# Patient Record
Sex: MALE | Race: WHITE | Employment: OTHER | URBAN - METROPOLITAN AREA
[De-identification: names, ages, dates, MRNs, and addresses within clinical notes are randomized per-mention and may not be internally consistent; named-entity substitution may affect disease eponyms.]

---

## 2017-02-02 ENCOUNTER — HOSPITAL ENCOUNTER (OUTPATIENT)
Dept: PHYSICAL THERAPY | Age: 52
Discharge: HOME | End: 2017-02-02
Admitting: NEUROLOGICAL SURGERY

## 2017-02-03 ENCOUNTER — HOSPITAL ENCOUNTER (OUTPATIENT)
Dept: OTHER | Age: 52
Discharge: HOME | End: 2017-02-28
Attending: INTERNAL MEDICINE | Admitting: INTERNAL MEDICINE

## 2017-02-07 ENCOUNTER — HOSPITAL ENCOUNTER (OUTPATIENT)
Dept: PHYSICAL THERAPY | Age: 52
Discharge: HOME | End: 2017-02-07
Admitting: INTERNAL MEDICINE

## 2017-02-09 ENCOUNTER — HOSPITAL ENCOUNTER (OUTPATIENT)
Dept: PHYSICAL THERAPY | Age: 52
Discharge: HOME | End: 2017-02-09
Admitting: INTERNAL MEDICINE

## 2017-02-14 ENCOUNTER — HOSPITAL ENCOUNTER (OUTPATIENT)
Dept: PHYSICAL THERAPY | Age: 52
Discharge: HOME | End: 2017-02-14
Admitting: INTERNAL MEDICINE

## 2017-02-16 ENCOUNTER — HOSPITAL ENCOUNTER (OUTPATIENT)
Dept: PHYSICAL THERAPY | Age: 52
Discharge: HOME | End: 2017-02-16
Admitting: INTERNAL MEDICINE

## 2017-02-28 ENCOUNTER — HOSPITAL ENCOUNTER (OUTPATIENT)
Dept: PHYSICAL THERAPY | Age: 52
Discharge: HOME | End: 2017-02-28
Admitting: INTERNAL MEDICINE

## 2017-03-02 ENCOUNTER — HOSPITAL ENCOUNTER (OUTPATIENT)
Dept: PHYSICAL THERAPY | Age: 52
Discharge: HOME | End: 2017-03-03
Admitting: NEUROLOGICAL SURGERY

## 2017-11-07 ENCOUNTER — PAT TELEPHONE (OUTPATIENT)
Dept: PREADMISSION TESTING | Age: 52
End: 2017-11-07

## 2017-11-07 VITALS — BODY MASS INDEX: 40.09 KG/M2 | WEIGHT: 280 LBS | HEIGHT: 70 IN

## 2017-11-07 RX ORDER — DIMENHYDRINATE 50 MG
TABLET ORAL
COMMUNITY

## 2017-11-07 RX ORDER — LANOLIN ALCOHOL/MO/W.PET/CERES
1000 CREAM (GRAM) TOPICAL DAILY
COMMUNITY

## 2017-11-07 NOTE — PROGRESS NOTES
[facility] time___1000_________        Surgery time__1100__________    Take the following medications with a sip of water:    Do not eat or drink anything after 12:00 midnight prior to your surgery. EXCEPT PREP  This includes water chewing gum, mints and ice chips. You may brush your teeth and gargle the morning of your surgery, but do not swallow the water       You may be asked to stop blood thinners such as Coumadin, Plavix, Fragmin, Lovenox, etc., or any anti-inflammatories such as:  Aspirin, Ibuprofen, Advil, Naproxen prior to your surgery. We also ask that you stop any OTC medications such as fish oil, vitamin E, glucosamine, garlic, Multivitamins, COQ 10, etc.    We ask that you do not smoke 24 hours prior to surgery  We ask that you do not  drink any alcoholic beverages 24 hours prior to surgery     You must make arrangements for a responsible adult to take you home after your surgery. For your safety you will not be allowed to leave alone or drive yourself home. Your surgery will be cancelled if you do not have a ride home. Also for your safety, it is strongly suggested that someone stay with you the first 24 hours after your surgery. A parent or legal guardian must accompany a child scheduled for surgery and plan to stay at the hospital until the child is discharged. Please do not bring other children with you. For your comfort, please wear simple loose fitting clothing to the hospital.  Please do not bring valuables. Do not wear any make-up or nail polish on your fingers or toes      For your safety, please do not wear any jewelry or body piercing's on the day of surgery. All jewelry must be removed. If you have dentures, they will be removed before going to operating room. For your convenience, we will provide you with a container.     If you wear contact lenses or glasses, they will be removed, please bring a case for them.     If you have a living will and a durable power of  for healthcare, please bring in a copy. As part of our patient safety program to minimize surgical site infections, we ask you to do the following:    · Please notify your surgeon if you develop any illness between         now and the  day of your surgery. · This includes a cough, cold, fever, sore throat, nausea,         or vomiting, and diarrhea, etc.  ·  Please notify your surgeon if you experience dizziness, shortness         of breath or blurred vision between now and the time of your surgery. You may shower the night before surgery or the morning of   your surgery with an antibacterial soap. You will need to bring a photo ID and insurance card    [facility] has an onsite pharmacy, would you like to utilize our pharmacy     If you will be staying overnight and use a C-pap machine, please bring   your C-pap to hospital     Our goal is to provide you with excellent care, therefore, visitors will be limited to two(2) in the room at a time so that we may focus on providing this care for you. Please contact pre-admission testing if you have any further questions. [facility] phone number:  [phone]  Please note these are generalized instructions for all surgical cases, you may be provided with more specific instructions according to your surgery.

## 2017-11-14 ENCOUNTER — HOSPITAL ENCOUNTER (OUTPATIENT)
Dept: ENDOSCOPY | Age: 52
Discharge: HOME | End: 2017-11-14
Attending: INTERNAL MEDICINE | Admitting: INTERNAL MEDICINE

## 2017-11-14 VITALS
DIASTOLIC BLOOD PRESSURE: 58 MMHG | HEART RATE: 75 BPM | RESPIRATION RATE: 16 BRPM | SYSTOLIC BLOOD PRESSURE: 132 MMHG | WEIGHT: 271 LBS | TEMPERATURE: 97.4 F | BODY MASS INDEX: 38.8 KG/M2 | OXYGEN SATURATION: 97 % | HEIGHT: 70 IN

## 2017-11-14 RX ORDER — PROMETHAZINE HYDROCHLORIDE 25 MG/ML
6.25 INJECTION, SOLUTION INTRAMUSCULAR; INTRAVENOUS
Status: ACTIVE | OUTPATIENT
Start: 2017-11-14 | End: 2017-11-14

## 2017-11-14 RX ORDER — SODIUM CHLORIDE 0.9 % (FLUSH) 0.9 %
10 SYRINGE (ML) INJECTION EVERY 12 HOURS SCHEDULED
Status: DISCONTINUED | OUTPATIENT
Start: 2017-11-14 | End: 2017-11-15 | Stop reason: HOSPADM

## 2017-11-14 RX ORDER — LABETALOL HYDROCHLORIDE 5 MG/ML
5 INJECTION, SOLUTION INTRAVENOUS EVERY 10 MIN PRN
Status: DISCONTINUED | OUTPATIENT
Start: 2017-11-14 | End: 2017-11-15 | Stop reason: HOSPADM

## 2017-11-14 RX ORDER — SODIUM CHLORIDE 9 MG/ML
INJECTION, SOLUTION INTRAVENOUS CONTINUOUS
Status: DISCONTINUED | OUTPATIENT
Start: 2017-11-14 | End: 2017-11-15 | Stop reason: HOSPADM

## 2017-11-14 RX ORDER — ONDANSETRON 2 MG/ML
4 INJECTION INTRAMUSCULAR; INTRAVENOUS
Status: ACTIVE | OUTPATIENT
Start: 2017-11-14 | End: 2017-11-14

## 2017-11-14 RX ORDER — SODIUM CHLORIDE 0.9 % (FLUSH) 0.9 %
10 SYRINGE (ML) INJECTION PRN
Status: DISCONTINUED | OUTPATIENT
Start: 2017-11-14 | End: 2017-11-15 | Stop reason: HOSPADM

## 2017-11-14 RX ADMIN — SODIUM CHLORIDE: 9 INJECTION, SOLUTION INTRAVENOUS at 10:55

## 2017-11-14 RX ADMIN — SODIUM CHLORIDE: 9 INJECTION, SOLUTION INTRAVENOUS at 10:52

## 2017-11-14 ASSESSMENT — PAIN SCALES - GENERAL
PAINLEVEL_OUTOF10: 0
PAINLEVEL_OUTOF10: 0

## 2017-11-14 ASSESSMENT — PAIN - FUNCTIONAL ASSESSMENT: PAIN_FUNCTIONAL_ASSESSMENT: 0-10

## 2017-11-14 NOTE — ANESTHESIA PRE-OP
[facility] Department of Anesthesiology  Pre-Anesthesia Evaluation/Consultation       Name:  Maranda Lang  : 1965  Age:  50 y.o. MRN:  [id number]  Date: 2017           Procedure (Scheduled):  Colonoscopy  Surgeon:  Dr. Clara Mason     No Known Allergies  There is no problem list on file for this patient. Past Medical History:   Diagnosis Date    CAD (coronary artery disease)     MI, old      Past Surgical History:   Procedure Laterality Date    BACK SURGERY      CARDIAC SURGERY      SENTS     Social History   Substance Use Topics    Smoking status: Former Smoker    Smokeless tobacco: Never Used    Alcohol use Yes      Comment: SOCIAL     Medications  Current Outpatient Prescriptions on File Prior to Encounter   Medication Sig Dispense Refill    aspirin 81 MG tablet Take 81 mg by mouth daily      Coenzyme Q10 (CO Q-10) 100 MG CAPS Take by mouth      vitamin B-12 (CYANOCOBALAMIN) 1000 MCG tablet Take 1,000 mcg by mouth daily       No current facility-administered medications on file prior to encounter. Current Outpatient Prescriptions   Medication Sig Dispense Refill    aspirin 81 MG tablet Take 81 mg by mouth daily      Coenzyme Q10 (CO Q-10) 100 MG CAPS Take by mouth      vitamin B-12 (CYANOCOBALAMIN) 1000 MCG tablet Take 1,000 mcg by mouth daily       Current Facility-Administered Medications   Medication Dose Route Frequency Provider Last Rate Last Dose    0.9 % sodium chloride infusion   Intravenous Continuous Zuly Alvarez MD        sodium chloride flush 0.9 % injection 10 mL  10 mL Intravenous 2 times per day Zuyl Alvarez MD        sodium chloride flush 0.9 % injection 10 mL  10 mL Intravenous PRN Zuly Alvarez MD        famotidine (PEPCID) injection 20 mg  20 mg Intravenous Once Zuly Alvarez MD         Vital Signs (Current) There were no vitals filed for this visit.   Vital Signs Statistics (for past 48 hrs)     No Data

## 2017-11-14 NOTE — PROCEDURES
[address] [place] [address] GI  Endoscopy Note    Patient: Usha Herrerauty  : 1965  Acct#: [de-identified]    Procedure: Colonoscopy     Date:  2017    Surgeon:  Colonel Chidi MD    Referring Physician:  Armen Mendoza    Previous Colonoscopy: No  Date: na  Greater than 3 years? na    Preoperative Diagnosis:  screening    Postoperative Diagnosis:  Normal colon    Anesthesia:  See anesthesia note    Indications: This is a 50y.o. year old male who presents today with screening for colon cancer. Procedure: An informed consent was obtained from the patient after explanation of indications, benefits, possible risks and complications of the procedure. The patient was then taken to the endoscopy suite, placed in the left lateral decubitus position, and the above IV anesthesia was administered. A digital rectal examination was performed and revealed negative without mass, lesions or tenderness. The Olympus CFQ-180-AL video colonoscope was placed in the patient's rectum under digital direction and advanced to the cecum. The cecum was identified by characteristic anatomy and ballottment. The prep was excellent. The ileocecal valve was identified. The scope was then withdrawn back through the cecum, ascending, transverse, descending and sigmoid colons. Carefull circumferential examination of the mucosa in these areas demonstrated normal colonic mucosa throughout. The scope was then withdrawn into the rectum and retroflexed. The retroflexed view of the anal verge and rectum demonstrates no abnormalities. The scope was straightened, the colon was decompressed and the scope was withdrawn from the patient. The patient tolerated the procedure well and was taken to the PACU in good condition. Estimated Blood Loss:  none    Impression:  Normal colon    Recommendations:  Repeat colonoscopy in 10 years.     Colonel Chidi MD   [facility]  2017

## 2017-11-14 NOTE — H&P
[facility]   Pre-operative History and Physical    Patient: Miller Sanchez  : 1965  Acct#: [de-identified]    History Obtained From: electronic medical record    HISTORY OF PRESENT ILLNESS  Procedure:Colonoscopy  Indications:screening  Past Medical History:        Diagnosis Date    CAD (coronary artery disease)     Hypertension     MI, old      Past Surgical History:        Procedure Laterality Date    BACK SURGERY      CARDIAC SURGERY      SENTS     Medications Prior to Admission:   Current Outpatient Prescriptions on File Prior to Encounter   Medication Sig Dispense Refill    aspirin 81 MG tablet Take 81 mg by mouth daily      Coenzyme Q10 (CO Q-10) 100 MG CAPS Take by mouth      vitamin B-12 (CYANOCOBALAMIN) 1000 MCG tablet Take 1,000 mcg by mouth daily       No current facility-administered medications on file prior to encounter. Allergies:  Review of patient's allergies indicates no known allergies. Social History     Social History    Marital status:      Spouse name: N/A    Number of children: N/A    Years of education: N/A     Occupational History    Not on file. Social History Main Topics    Smoking status: Former Smoker    Smokeless tobacco: Never Used    Alcohol use Yes      Comment: SOCIAL    Drug use: Unknown    Sexual activity: Not on file     Other Topics Concern    Not on file     Social History Narrative    No narrative on file     Family History   Problem Relation Age of Onset    Heart Disease Mother     Heart Disease Father     Heart Attack Father          PHYSICAL EXAM:      BP (!) 151/97   Pulse 79   Temp 98.1 °F (36.7 °C) (Temporal)   Resp 12   Ht 5' 10\" (1.778 m)   Wt 271 lb (122.9 kg)   SpO2 97%   BMI 38.88 kg/m²  I        Heart:normal    Lungs: normal    Abdomen: normal      ASA Grade:  See anesthesia note      ASSESSMENT AND PLAN:    1. Procedure options, risks and benefits reviewed with patient and expresses understanding.

## 2017-11-14 NOTE — ANESTHESIA POST-OP
[facility] Department of Anesthesiology  Post-Anesthesia Note       Name:  Niko Thibodeaux                                  Age:  50 y.o. MRN:  [id number]     Last Vitals & Oxygen Saturation: BP (!) 132/58   Pulse 75   Temp 97.4 °F (36.3 °C) (Tympanic)   Resp 16   Ht 5' 10\" (1.778 m)   Wt 271 lb (122.9 kg)   SpO2 97%   BMI 38.88 kg/m²   Patient Vitals for the past 4 hrs:   BP Temp Temp src Pulse Resp SpO2 Height Weight   11/14/17 1150 (!) 132/58 - - 75 16 97 % - -   11/14/17 1140 131/67 - - 78 16 98 % - -   11/14/17 1130 119/70 97.4 °F (36.3 °C) Tympanic 77 16 96 % - -   11/14/17 1033 (!) 151/97 98.1 °F (36.7 °C) Temporal 79 12 97 % 5' 10\" (1.778 m) 271 lb (122.9 kg)       Level of consciousness:  Awake, alert    Respiratory: Respirations easy, no distress. Stable. Cardiovascular: Hemodynamically stable. Hydration: Adequate. PONV: Adequately managed. Post-op pain: Adequately controlled. Post-op assessment: Tolerated anesthetic well without complication. Complications:  None.     Ann Smith MD  November 14, 2017   12:36 PM

## 2017-11-15 LAB
EKG ATRIAL RATE: 70 BPM
EKG DIAGNOSIS: NORMAL
EKG P AXIS: 26 DEGREES
EKG P-R INTERVAL: 172 MS
EKG Q-T INTERVAL: 404 MS
EKG QRS DURATION: 112 MS
EKG QTC CALCULATION (BAZETT): 436 MS
EKG R AXIS: -59 DEGREES
EKG T AXIS: 22 DEGREES
EKG VENTRICULAR RATE: 70 BPM

## 2022-07-22 ENCOUNTER — HOSPITAL ENCOUNTER (OUTPATIENT)
Dept: VASCULAR LAB | Age: 57
Discharge: HOME | End: 2022-07-22
Payer: COMMERCIAL

## 2022-07-22 DIAGNOSIS — M79.89 LEG SWELLING: ICD-10-CM

## 2022-07-22 PROCEDURE — 93971 EXTREMITY STUDY: CPT

## 2022-09-14 ENCOUNTER — TRANSCRIBE ORDERS (OUTPATIENT)
Dept: ADMINISTRATIVE | Age: 57
End: 2022-09-14

## 2022-09-14 DIAGNOSIS — M54.16 LUMBAR RADICULOPATHY: ICD-10-CM

## 2022-09-14 DIAGNOSIS — M54.50 CHRONIC LOW BACK PAIN, UNSPECIFIED BACK PAIN LATERALITY, UNSPECIFIED WHETHER SCIATICA PRESENT: Primary | ICD-10-CM

## 2022-09-14 DIAGNOSIS — M21.372 LEFT FOOT DROP: ICD-10-CM

## 2022-09-14 DIAGNOSIS — M79.89 SWOLLEN LEG: ICD-10-CM

## 2022-09-14 DIAGNOSIS — G89.29 CHRONIC LOW BACK PAIN, UNSPECIFIED BACK PAIN LATERALITY, UNSPECIFIED WHETHER SCIATICA PRESENT: Primary | ICD-10-CM

## 2022-09-14 DIAGNOSIS — Z98.1 S/P LUMBAR SPINAL FUSION: ICD-10-CM

## 2022-09-24 ENCOUNTER — HOSPITAL ENCOUNTER (OUTPATIENT)
Dept: MRI IMAGING | Age: 57
Discharge: HOME | End: 2022-09-24
Payer: COMMERCIAL

## 2022-09-24 DIAGNOSIS — M21.372 LEFT FOOT DROP: ICD-10-CM

## 2022-09-24 DIAGNOSIS — M54.50 CHRONIC LOW BACK PAIN, UNSPECIFIED BACK PAIN LATERALITY, UNSPECIFIED WHETHER SCIATICA PRESENT: ICD-10-CM

## 2022-09-24 DIAGNOSIS — M54.16 LUMBAR RADICULOPATHY: ICD-10-CM

## 2022-09-24 DIAGNOSIS — M79.89 SWOLLEN LEG: ICD-10-CM

## 2022-09-24 DIAGNOSIS — G89.29 CHRONIC LOW BACK PAIN, UNSPECIFIED BACK PAIN LATERALITY, UNSPECIFIED WHETHER SCIATICA PRESENT: ICD-10-CM

## 2022-09-24 DIAGNOSIS — Z98.1 S/P LUMBAR SPINAL FUSION: ICD-10-CM

## 2022-09-24 PROCEDURE — 72148 MRI LUMBAR SPINE W/O DYE: CPT

## 2023-04-03 ENCOUNTER — HOSPITAL ENCOUNTER (OUTPATIENT)
Dept: PHYSICAL THERAPY | Age: 58
Setting detail: THERAPIES SERIES
Discharge: HOME | End: 2023-04-03
Payer: COMMERCIAL

## 2023-04-03 PROCEDURE — 97530 THERAPEUTIC ACTIVITIES: CPT

## 2023-04-03 PROCEDURE — 97162 PT EVAL MOD COMPLEX 30 MIN: CPT

## 2023-04-03 NOTE — FLOWSHEET NOTE
38% to 15% to assist with reaching prior level of function. [] Progressing: [] Met: [] Not Met: [] Adjusted  2. Patient will demonstrate increased AROM to WNL, good LS mobility, good hip ROM to allow for proper joint functioning as indicated by patients Functional Deficits. [] Progressing: [] Met: [] Not Met: [] Adjusted  3. Patient will demonstrate an increase in Strength to good proximal hip and core activation to allow for proper functional mobility as indicated by patients Functional Deficits. [] Progressing: [] Met: [] Not Met: [] Adjusted  4. Patient will be able to ambulate a flight of stairs reciprocally without increased symptoms or restriction. [] Progressing: [] Met: [] Not Met: [] Adjusted  5. Able to perform at least 15 reps of sit to  30sec to get closer to age adjusted norms. [] Progressing: [] Met: [] Not Met: [] Adjusted         ASSESSMENT:  See eval    Treatment/Activity Tolerance:  [x] Patient tolerated treatment well [] Patient limited by fatique  [] Patient limited by pain  [] Patient limited by other medical complications  [] Other:     Overall Progression Towards Functional goals/ Treatment Progress Update:  [] Patient is progressing as expected towards functional goals listed. [] Progression is slowed due to complexities/Impairments listed. [] Progression has been slowed due to co-morbidities. [x] Plan just implemented, too soon to assess goals progression <30days   [] Goals require adjustment due to lack of progress  [] Patient is not progressing as expected and requires additional follow up with physician  [] Other:    Prognosis for POC: [x] Good [] Fair  [] Poor    Patient requires continued skilled intervention: [x] Yes  [] No        PLAN: See eval  [] Continue per plan of care [] Alter current plan (see comments)  [x] Plan of care initiated [] Hold pending MD visit [] Discharge    Electronically signed by:  Nidhi Hyman, PT , DPT  #[id number]      Note: If patient

## 2023-04-03 NOTE — PLAN OF CARE
joint protection, postural re-education, activity modification, progression of HEP. [x]  Aquatic exercise including: strength training, ROM and balance for LE, Glutes and core     HEP instruction: sit to stands    GOALS:  Patient stated goal: able to walk 1 mile w/o severe limitation or pain. [] Progressing: [] Met: [] Not Met: [] Adjusted  Therapist goals for Patient:   Short Term Goals: To be achieved in: 2 weeks  1. Independent in HEP and progression per patient tolerance, in order to prevent re-injury. [] Progressing: [] Met: [] Not Met: [] Adjusted  2. Patient will have a decrease in pain to facilitate improvement in movement, function, and ADLs as indicated by Functional Deficits. [] Progressing: [] Met: [] Not Met: [] Adjusted    Long Term Goals: To be achieved in: up to 10 weeks  1. Decrease Modified Oswestry functional outcome score from 38% to 15% to assist with reaching prior level of function. [] Progressing: [] Met: [] Not Met: [] Adjusted  2. Patient will demonstrate increased AROM to WNL, good LS mobility, good hip ROM to allow for proper joint functioning as indicated by patients Functional Deficits. [] Progressing: [] Met: [] Not Met: [] Adjusted  3. Patient will demonstrate an increase in Strength to good proximal hip and core activation to allow for proper functional mobility as indicated by patients Functional Deficits. [] Progressing: [] Met: [] Not Met: [] Adjusted  4. Patient will be able to ambulate a flight of stairs reciprocally without increased symptoms or restriction. [] Progressing: [] Met: [] Not Met: [] Adjusted  5. Able to perform at least 15 reps of sit to  30sec to get closer to age adjusted norms. [] Progressing: [] Met: [] Not Met: [] Adjusted     Electronically signed by:   Treasure Valentin, PT , DPT  #[id number]          Note: If patient does not return for scheduled/recommended follow up visits, this note will serve as a discharge from care along with the

## 2023-04-06 ENCOUNTER — HOSPITAL ENCOUNTER (OUTPATIENT)
Dept: PHYSICAL THERAPY | Age: 58
Setting detail: THERAPIES SERIES
Discharge: HOME | End: 2023-04-06
Payer: COMMERCIAL

## 2023-04-06 PROCEDURE — 97530 THERAPEUTIC ACTIVITIES: CPT

## 2023-04-06 PROCEDURE — 97110 THERAPEUTIC EXERCISES: CPT

## 2023-04-06 NOTE — FLOWSHEET NOTE
coordination, kinesthetic sense, posture, motor skill, proprioception  to assist with core control in self care, mobility, lifting, and ambulation. Therapeutic Activities:    [] (06047 or 14673) Provided verbal/tactile cueing for activities related to improving balance, coordination, kinesthetic sense, posture, motor skill, proprioception and motor activation to allow for proper function  with self care and ADLs  [] (23513) Provided training and instruction to the patient for proper core and proximal hip recruitment and positioning with ambulation re-education     Home Exercise Program:    [] (46958) Reviewed/Progressed HEP activities related to strengthening, flexibility, endurance, ROM of core, proximal hip and LE for functional self-care, mobility, lifting and ambulation   [] (04196) Reviewed/Progressed HEP activities related to improving balance, coordination, kinesthetic sense, posture, motor skill, proprioception of core, proximal hip and LE for self care, mobility, lifting, and ambulation      Manual Treatments:  PROM / STM / Oscillations-Mobs:  G-I, II, III, IV (PA's, Inf., Post.)  [] (77837) Provided manual therapy to mobilize proximal hip and LS spine soft tissue/joints for the purpose of modulating pain, promoting relaxation,  increasing ROM, reducing/eliminating soft tissue swelling/inflammation/restriction, improving soft tissue extensibility and allowing for proper ROM for normal function with self care, mobility, lifting and ambulation.      If [facility] Please Indicate Time In/Out  CPT Code Time in Time out   12037          88932 300 320   89879 320 345               Approval Dates:  CPT Code Units Approved Units Used  Date Updated:                     Charges:  Timed Code Treatment Minutes: 45   Total Treatment Minutes: 45     [] EVAL (LOW) 13688 (typically 20 minutes face-to-face)  [] EVAL (MOD) 54220 (typically 30 minutes face-to-face)  [] EVAL (HIGH) 71524 (typically 45 minutes face-to-face)  []

## 2023-04-07 ENCOUNTER — APPOINTMENT (OUTPATIENT)
Dept: PHYSICAL THERAPY | Age: 58
End: 2023-04-07
Payer: COMMERCIAL

## 2023-04-12 ENCOUNTER — APPOINTMENT (OUTPATIENT)
Dept: PHYSICAL THERAPY | Age: 58
End: 2023-04-12
Payer: COMMERCIAL

## 2023-04-17 ENCOUNTER — HOSPITAL ENCOUNTER (OUTPATIENT)
Dept: PHYSICAL THERAPY | Age: 58
Setting detail: THERAPIES SERIES
Discharge: HOME | End: 2023-04-17
Payer: COMMERCIAL

## 2023-04-17 PROCEDURE — 97110 THERAPEUTIC EXERCISES: CPT

## 2023-04-17 PROCEDURE — 97530 THERAPEUTIC ACTIVITIES: CPT

## 2023-04-17 NOTE — FLOWSHEET NOTE
Provided manual therapy to mobilize proximal hip and LS spine soft tissue/joints for the purpose of modulating pain, promoting relaxation,  increasing ROM, reducing/eliminating soft tissue swelling/inflammation/restriction, improving soft tissue extensibility and allowing for proper ROM for normal function with self care, mobility, lifting and ambulation. If [facility] Please Indicate Time In/Out  CPT Code Time in Time out   [place] [id number]   83523   [phone]               Approval Dates:  CPT Code Units Approved Units Used  Date Updated:                     Charges:  Timed Code Treatment Minutes: 40   Total Treatment Minutes: 40     [] EVAL (LOW) 26405 (typically 20 minutes face-to-face)  [] EVAL (MOD) 09010 (typically 30 minutes face-to-face)  [] EVAL (HIGH) 96236 (typically 45 minutes face-to-face)  [] RE-EVAL     [x] ON(86522) x 2    [] Dry needle 1 or 2 Muscles (09041)  [] NMR (68357) x     [] Dry needle 3+ Muscles (01357)  [] Manual (68578) x     [] Ultrasound (61085) x  [x] TA (08344) x     [] Mech Traction (93111)  [] ES(attended) (34081)     [] ES (un) (65448):   [] Vasopump (47588) [] Ionto (17474)   [] Other:    Bryan Omalley stated goal: able to walk 1 mile w/o severe limitation or pain. [] Progressing: [] Met: [] Not Met: [] Adjusted  Therapist goals for Patient:   Short Term Goals: To be achieved in: 2 weeks  1. Independent in HEP and progression per patient tolerance, in order to prevent re-injury. [] Progressing: [] Met: [] Not Met: [] Adjusted  2. Patient will have a decrease in pain to facilitate improvement in movement, function, and ADLs as indicated by Functional Deficits. [] Progressing: [] Met: [] Not Met: [] Adjusted     Long Term Goals: To be achieved in: up to 10 weeks  1. Decrease Modified Oswestry functional outcome score from 38% to 15% to assist with reaching prior level of function. [] Progressing: [] Met: [] Not Met: [] Adjusted  2.  Patient will demonstrate

## 2023-04-19 ENCOUNTER — APPOINTMENT (OUTPATIENT)
Dept: PHYSICAL THERAPY | Age: 58
End: 2023-04-19
Payer: COMMERCIAL

## 2023-04-20 ENCOUNTER — HOSPITAL ENCOUNTER (OUTPATIENT)
Dept: PHYSICAL THERAPY | Age: 58
Setting detail: THERAPIES SERIES
Discharge: HOME | End: 2023-04-20
Payer: COMMERCIAL

## 2023-04-20 PROCEDURE — 97112 NEUROMUSCULAR REEDUCATION: CPT

## 2023-04-20 PROCEDURE — 97110 THERAPEUTIC EXERCISES: CPT

## 2023-04-20 NOTE — FLOWSHEET NOTE
[place] [facility], [facility]  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]  Foot drop, left foot [M21.372]  Treatment Diagnosis: back pain, limited function, unable to work, L foot drop    Insurance/Certification information:     Physician Information:  August Howard MD  Plan of care signed (Y/N):     Date of Patient follow up with Physician:      Progress Report: []  Yes  [x]  No     Date Range for reporting period:  Beginnin/3/2023  Ending:    Progress report due (10 Rx/or 30 days whichever is less): #85     Recertification due (POC duration/ or 90 days whichever is less):     Visit # POC/ Insurance Allowable Auth Needed   [facility] 12 visits till  []Yes    []No     Functional Outcomes Measure:     Test: Modified Oswestry   Date Assessed Score               Pain level:     /10     History of Injury: Pt here with several complaints he has had about 4 back surgeries he started to have bad pain around  with R back/ hip pain. He has seen several doctors, most recently started with Shahrzad Cesar. His primary c/o is central  LBP (\"a 1 ft [place]\") of bad pain around his back. He had an MRI and is possibly planning a spinal stimulator. He also has an upcoming functional capacity evaluation around  because he cannot work (used to work as  - working overhead/ on ladders). He states he has had foot drop since . In , he states he lost the ability to push off (calf raise) on the left foot.

## 2023-04-20 NOTE — PLAN OF CARE
[address] [place]  [address] [facility]  Phone: [phone]   Fax:     [phone]    Physical Therapy Prescription    Date: 2023    Patient Name: Saloni Haq  : 1965  MRN: [id number]    Diagnosis:Other intervertebral disc displacement, lumbar region [M51.26]  Other intervertebral disc displacement, lumbosacral region [M51.27]  Foot drop, left foot [M21.372]  Treatment Diagnosis:      Referring Physician: Terese Interiano MD    Drug Allergies:    With your approval we would like to add the following to the patient's current PT treatment:    [x] Dry Needling   [] TENS Unit        [] [facility] Cervical Traction Unit        [] [facility] Lumbar Traction Unit    [] [place] [facility]ill        [] Rolling/Standard Rodger Hero         [] Iontophoresis: Dexamethasone 4mg/ml injectable-30 ml vial     Quantity: 1 vial for iontophoresis     As needed        Electronically signed by: Sunshine Boswell PT     If you have any questions or concerns, please don't hesitate to call.   Thank you for your referral.    Physician Signature:________________________________Date:__________________  By signing above, therapists plan is approved by physician

## 2023-04-24 ENCOUNTER — HOSPITAL ENCOUNTER (OUTPATIENT)
Dept: PHYSICAL THERAPY | Age: 58
Setting detail: THERAPIES SERIES
Discharge: HOME | End: 2023-04-24
Payer: COMMERCIAL

## 2023-04-24 PROCEDURE — 97112 NEUROMUSCULAR REEDUCATION: CPT

## 2023-04-24 PROCEDURE — 97110 THERAPEUTIC EXERCISES: CPT

## 2023-04-24 NOTE — FLOWSHEET NOTE
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.27]  Foot drop, left foot [M21.372]  Treatment Diagnosis: back pain, limited function, unable to work, L foot drop    Insurance/Certification information:     Physician Information:  Amanda Soler MD  Plan of care signed (Y/N):     Date of Patient follow up with Physician:      Progress Report: []  Yes  [x]  No     Date Range for reporting period:  Beginnin/3/2023  Ending:    Progress report due (10 Rx/or 30 days whichever is less): #35     Recertification due (POC duration/ or 90 days whichever is less):     Visit # POC/ Insurance Allowable Auth Needed   [facility] 12 visits till  []Yes    []No     Functional Outcomes Measure:     Test: Modified Oswestry   Date Assessed Score               Pain level:     5/10     History of Injury: Pt here with several complaints he has had about 4 back surgeries he started to have bad pain around  with R back/ hip pain. He has seen several doctors, most recently started with Demarco Alford. His primary c/o is central  LBP (\"a 1 ft [place]\") of bad pain around his back. He had an MRI and is possibly planning a spinal stimulator. He also has an upcoming functional capacity evaluation around  because he cannot work (used to work as  - working overhead/ on ladders). He states he has had foot drop since .  In , he states he lost the ability to push off (calf raise) on the left

## 2023-04-26 ENCOUNTER — APPOINTMENT (OUTPATIENT)
Dept: PHYSICAL THERAPY | Age: 58
End: 2023-04-26
Payer: COMMERCIAL

## 2023-04-27 ENCOUNTER — HOSPITAL ENCOUNTER (OUTPATIENT)
Dept: PHYSICAL THERAPY | Age: 58
Setting detail: THERAPIES SERIES
Discharge: HOME | End: 2023-04-27
Payer: COMMERCIAL

## 2023-04-27 PROCEDURE — 97112 NEUROMUSCULAR REEDUCATION: CPT

## 2023-04-27 PROCEDURE — 97530 THERAPEUTIC ACTIVITIES: CPT

## 2023-04-27 PROCEDURE — 97110 THERAPEUTIC EXERCISES: CPT

## 2023-04-27 NOTE — FLOWSHEET NOTE
to walk 1 mile w/o severe limitation or pain. [x] Progressing: [] Met: [] Not Met: [] Adjusted  Therapist goals for Patient:   Short Term Goals: To be achieved in: 2 weeks  1. Independent in HEP and progression per patient tolerance, in order to prevent re-injury. [x] Progressing: [] Met: [] Not Met: [] Adjusted  2. Patient will have a decrease in pain to facilitate improvement in movement, function, and ADLs as indicated by Functional Deficits. [x] Progressing: [] Met: [] Not Met: [] Adjusted     Long Term Goals: To be achieved in: up to 10 weeks  1. Decrease Modified Oswestry functional outcome score from 38% to 15% to assist with reaching prior level of function. [x] Progressing: [] Met: [] Not Met: [] Adjusted  2. Patient will demonstrate increased AROM to WNL, good LS mobility, good hip ROM to allow for proper joint functioning as indicated by patients Functional Deficits. [x] Progressing: [] Met: [] Not Met: [] Adjusted  3. Patient will demonstrate an increase in Strength to good proximal hip and core activation to allow for proper functional mobility as indicated by patients Functional Deficits. [x] Progressing: [] Met: [] Not Met: [] Adjusted  4. Patient will be able to ambulate a flight of stairs reciprocally without increased symptoms or restriction. [x] Progressing: [] Met: [] Not Met: [] Adjusted  5. Able to perform at least 15 reps of sit to  30sec to get closer to age adjusted norms. [x] Progressing: [] Met: [] Not Met: [] Adjusted         ASSESSMENT:  Pt will benefit from PT from graded progression and exposure and functional training to enhance his ability to function w/o pain or limitations. 4--17-23 after ex  burning/ fatigue B LB/ hips. 4/20: pt moni session fairly well. He had mod fatigue with lawnmowers, push ups, and deadlifts. He did report increased back pain, but he didn't report pain during ex's, just afterwards when sitting.  This may have been more of a

## 2023-05-01 ENCOUNTER — HOSPITAL ENCOUNTER (OUTPATIENT)
Dept: PHYSICAL THERAPY | Age: 58
Setting detail: THERAPIES SERIES
Discharge: HOME | End: 2023-05-01
Payer: COMMERCIAL

## 2023-05-01 PROCEDURE — 97112 NEUROMUSCULAR REEDUCATION: CPT

## 2023-05-01 PROCEDURE — 97110 THERAPEUTIC EXERCISES: CPT

## 2023-05-08 ENCOUNTER — HOSPITAL ENCOUNTER (OUTPATIENT)
Dept: PHYSICAL THERAPY | Age: 58
Setting detail: THERAPIES SERIES
Discharge: HOME | End: 2023-05-08
Payer: COMMERCIAL

## 2023-05-08 PROCEDURE — 97110 THERAPEUTIC EXERCISES: CPT

## 2023-05-08 PROCEDURE — 97112 NEUROMUSCULAR REEDUCATION: CPT

## 2023-05-08 NOTE — FLOWSHEET NOTE
[facility], [facility]  [address] [facility] [address] [place], [facility]  Phone: [phone]   Fax:     [phone]    Physical Therapy Treatment Note/ Progress Report:     Date:  2023    Patient Name:  Angela Min    :  1965  MRN: [id number]    Pertinent Medical History:      Medical/Treatment Diagnosis Information:  Medical Diagnosis: Other intervertebral disc displacement, lumbar region [M51.26]  Other intervertebral disc displacement, lumbosacral region [M51.27]  Foot drop, left foot [M21.372]  Treatment Diagnosis: back pain, limited function, unable to work, L foot drop    Insurance/Certification information:     Physician Information:  Teresa Mcpherson MD  Plan of care signed (Y/N):     Date of Patient follow up with Physician:      Progress Report: []  Yes  [x]  No     Date Range for reporting period:  Beginnin/3/2023  Ending:    Progress report due (10 Rx/or 30 days whichever is less): #24     Recertification due (POC duration/ or 90 days whichever is less):     Visit # POC/ Insurance Allowable Auth Needed   10 /  [facility] 12 visits till  []Yes    []No     Functional Outcomes Measure:     Test: Modified Oswestry   Date Assessed Score   eval 35% (15% goal)    34%    at 10 34%       Pain level:     5/10     History of Injury: Pt here with several complaints he has had about 4 back surgeries he started to have bad pain around  with R back/ hip pain. He has seen several doctors, most recently started with Sandro Malik. His primary c/o is central  LBP (\"a 1 ft [place]\") of bad pain around his back. He had an MRI and is possibly planning a spinal stimulator. He also has an upcoming functional capacity evaluation around  because he cannot work (used to work as  - working overhead/ on ladders). He states he has had foot drop since .  In , he states he lost the ability

## 2023-05-15 ENCOUNTER — HOSPITAL ENCOUNTER (OUTPATIENT)
Dept: PHYSICAL THERAPY | Age: 58
Setting detail: THERAPIES SERIES
Discharge: HOME | End: 2023-05-15
Payer: COMMERCIAL

## 2023-05-15 PROCEDURE — 97112 NEUROMUSCULAR REEDUCATION: CPT

## 2023-05-15 PROCEDURE — 97110 THERAPEUTIC EXERCISES: CPT

## 2023-05-15 NOTE — FLOWSHEET NOTE
Plan of care initiated [] Hold pending MD visit [] Discharge    Electronically signed by: Talisha Eagle PT  ,[id number]            Note: If patient does not return for scheduled/recommended follow up visits, this note will serve as a discharge from care along with the most recent update on progress.

## 2023-05-22 ENCOUNTER — HOSPITAL ENCOUNTER (OUTPATIENT)
Dept: PHYSICAL THERAPY | Age: 58
Setting detail: THERAPIES SERIES
Discharge: HOME | End: 2023-05-22
Payer: COMMERCIAL

## 2023-05-22 PROCEDURE — 97112 NEUROMUSCULAR REEDUCATION: CPT

## 2023-05-22 PROCEDURE — 97530 THERAPEUTIC ACTIVITIES: CPT

## 2023-05-22 PROCEDURE — 97110 THERAPEUTIC EXERCISES: CPT

## 2023-05-22 NOTE — FLOWSHEET NOTE
prevent re-injury. [] Progressing: [x] Met: [] Not Met: [] Adjusted  2. Patient will have a decrease in pain to facilitate improvement in movement, function, and ADLs as indicated by Functional Deficits. [] Progressing: [x] Met: [] Not Met: [] Adjusted     Long Term Goals: To be achieved in: up to 10 weeks  1. Decrease Modified Oswestry functional outcome score from 38% to 15% to assist with reaching prior level of function. [x] Progressing: [] Met: [] Not Met: [] Adjusted  2. Patient will demonstrate increased AROM to WNL, good LS mobility, good hip ROM to allow for proper joint functioning as indicated by patients Functional Deficits. [x] Progressing: [] Met: [] Not Met: [] Adjusted  3. Patient will demonstrate an increase in Strength to good proximal hip and core activation to allow for proper functional mobility as indicated by patients Functional Deficits. [x] Progressing: [] Met: [] Not Met: [] Adjusted  4. Patient will be able to ambulate a flight of stairs reciprocally without increased symptoms or restriction. [] Progressing: [x] Met: [] Not Met: [] Adjusted  5. Able to perform at least 15 reps of sit to  30sec to get closer to age adjusted norms. [x] Progressing: [] Met: [] Not Met: [] Adjusted         ASSESSMENT:  Pt will benefit from PT from graded progression and exposure and functional training to enhance his ability to function w/o pain or limitations. 4--17-23 after ex  burning/ fatigue B LB/ hips. 4/20: pt moni session fairly well. He had mod fatigue with lawnmowers, push ups, and deadlifts. He did report increased back pain, but he didn't report pain during ex's, just afterwards when sitting. This may have been more of a fatigue/ soreness/ tightness in his lower back given how each exercise was handled in the moment. 4-24-23 some ex deferred due to increased c/o of L shld/ R elbow.   4/27: Pt overall improving activity tolerance with PT program. His pain is relatively

## 2025-04-23 DIAGNOSIS — H91.90 HEARING LOSS, UNSPECIFIED HEARING LOSS TYPE, UNSPECIFIED LATERALITY: Primary | ICD-10-CM

## 2025-04-23 NOTE — PROGRESS NOTES
Piter Retana   1965, 60 y.o. male   [id number]       Referring Provider: Aba Bray MD  Referral Type: In an order in Epic    Reason for Visit: Evaluation of suspected change in hearing, tinnitus, or balance.    ADULT AUDIOLOGIC EVALUATION      Piter Retana is a 60 y.o. male seen today, 4/24/2025 , for an initial audiologic evaluation.  Patient was seen by Aba Bray MD following today's evaluation.    AUDIOLOGIC AND OTHER PERTINENT MEDICAL HISTORY:      Piter Retana reports a gradual onset of humming tinnitus, bilaterally. He notes a history of loud noise exposure. Patient reports a family history of wax impaction. Patient notes minimal aural pressure and pressure below the pinna, AU.    He denied otalgia, otorrhea, dizziness, imbalance, history of head trauma, history of ear surgery, and family history of hearing loss    Date: 4/24/2025     IMPRESSIONS:      Today's results revealed an asymmetrical sensorineural hearing loss @ 2-4 kHz (Left Ear better than the Right Ear)    Excellent speech understanding when in quiet. Tympanometry indicates Right Ear normal middle ear function and Left Ear normal middle ear function.    Discussed test results and implications with patient. Discussed benefits of amplification pending medical clearance. Recommended consult with ENT physician due to noted history of asymmetrical hearing loss. Hearing aids recommended at this time. Discussed noise exposure and the importance of appropriate hearing protection when in hazardous noise environments.    Follow medical recommendations of Aba Bray MD.    ASSESSMENT AND FINDINGS:     Otoscopy revealed Left Ear non-occluding cerumen; Right Ear clear ear canal .    RIGHT EAR:  Hearing Sensitivity: Normal sloping to Severe Sensorineural hearing loss, rising to Moderate Sensorineural loss @ 6-8 kHz  Speech Recognition Threshold: 25 dB HL  Word Recognition: Excellent 96%, based on NU-6 by-difficulty list at 65

## 2025-04-24 ENCOUNTER — PROCEDURE VISIT (OUTPATIENT)
Dept: AUDIOLOGY | Age: 60
End: 2025-04-24
Payer: MEDICARE

## 2025-04-24 ENCOUNTER — OFFICE VISIT (OUTPATIENT)
Dept: ENT CLINIC | Age: 60
End: 2025-04-24
Payer: MEDICARE

## 2025-04-24 VITALS — BODY MASS INDEX: 42.06 KG/M2 | WEIGHT: 284 LBS | HEIGHT: 69 IN

## 2025-04-24 DIAGNOSIS — H93.13 TINNITUS OF BOTH EARS: ICD-10-CM

## 2025-04-24 DIAGNOSIS — H90.3 SENSORINEURAL HEARING LOSS (SNHL) OF BOTH EARS: ICD-10-CM

## 2025-04-24 DIAGNOSIS — H91.8X3 ASYMMETRICAL HEARING LOSS: Primary | ICD-10-CM

## 2025-04-24 DIAGNOSIS — H90.3 SENSORINEURAL HEARING LOSS (SNHL) OF BOTH EARS: Primary | ICD-10-CM

## 2025-04-24 PROCEDURE — 3017F COLORECTAL CA SCREEN DOC REV: CPT | Performed by: OTOLARYNGOLOGY

## 2025-04-24 PROCEDURE — 92557 COMPREHENSIVE HEARING TEST: CPT

## 2025-04-24 PROCEDURE — 99203 OFFICE O/P NEW LOW 30 MIN: CPT | Performed by: OTOLARYNGOLOGY

## 2025-04-24 PROCEDURE — 92567 TYMPANOMETRY: CPT

## 2025-04-24 PROCEDURE — G8427 DOCREV CUR MEDS BY ELIG CLIN: HCPCS | Performed by: OTOLARYNGOLOGY

## 2025-04-24 PROCEDURE — 4004F PT TOBACCO SCREEN RCVD TLK: CPT | Performed by: OTOLARYNGOLOGY

## 2025-04-24 PROCEDURE — G8417 CALC BMI ABV UP PARAM F/U: HCPCS | Performed by: OTOLARYNGOLOGY

## 2025-04-24 NOTE — PROGRESS NOTES
[facility]  DIVISION OF OTOLARYNGOLOGY- HEAD & NECK SURGERY  Follow up      Patient Name: Piter Retana  Medical Record Number:  [id number]  Primary Care Physician:  Ceasar Desai  Date of Consultation: 4/24/2025    Chief Complaint: Ringing in the ears        Interval History  Patient is presenting for ringing in the ears.  The patient says that he has worked in a noisy environment as a  for a long time.  He does not have any other significant ear history.  He is never had ear surgery.  He said that he drowns out the ringing at night by turning on the television.            REVIEW OF SYSTEMS  As above    PHYSICAL EXAM  GENERAL: No Acute Distress, Alert and Oriented, no Hoarseness, strong voice  EYES: EOMI, Anti-icteric  HENT:   Head: Normocephalic and atraumatic.   Face:  Symmetric, facial nerve intact, no sinus tenderness  Right Ear: Normal external ear, normal external auditory canal, intact tympanic membrane with normal mobility and aerated middle ear  Left Ear: Normal external ear, normal external auditory canal, intact tympanic membrane with normal mobility and aerated middle ear  Mouth/Oral Cavity:  normal lips, Uvula is midline, no mucosal lesions, no trismus  Oropharynx/Larynx:  normal oropharynx  Nose:Normal external nasal appearance.   NECK: Normal range of motion, no thyromegaly, trachea is midline, no lymphadenopathy, no neck masses, no crepitus      Patient an audiogram today shows normal sloping to severe since no hearing loss        ASSESSMENT/PLAN  1. Sensorineural hearing loss (SNHL) of both ears  This is consistent with noise exposure.  Discussed hearing aids.  He is interested.    2. Tinnitus of both ears  This is secondary to hearing loss.  The most effective treatment would be hearing aids.  He is already using masking techniques at night.             I have performed a head and neck physical exam personally or was physically present during the key or critical